# Patient Record
Sex: FEMALE | Race: OTHER | NOT HISPANIC OR LATINO | URBAN - METROPOLITAN AREA
[De-identification: names, ages, dates, MRNs, and addresses within clinical notes are randomized per-mention and may not be internally consistent; named-entity substitution may affect disease eponyms.]

---

## 2020-03-17 ENCOUNTER — INPATIENT (INPATIENT)
Facility: HOSPITAL | Age: 40
LOS: 5 days | Discharge: HOME | End: 2020-03-23
Attending: OBSTETRICS & GYNECOLOGY | Admitting: OBSTETRICS & GYNECOLOGY
Payer: MEDICAID

## 2020-03-17 VITALS — WEIGHT: 185.19 LBS | HEIGHT: 61 IN

## 2020-03-17 DIAGNOSIS — O26.899 OTHER SPECIFIED PREGNANCY RELATED CONDITIONS, UNSPECIFIED TRIMESTER: ICD-10-CM

## 2020-03-17 DIAGNOSIS — Z34.80 ENCOUNTER FOR SUPERVISION OF OTHER NORMAL PREGNANCY, UNSPECIFIED TRIMESTER: ICD-10-CM

## 2020-03-17 DIAGNOSIS — Z3A.00 WEEKS OF GESTATION OF PREGNANCY NOT SPECIFIED: ICD-10-CM

## 2020-03-17 LAB
APTT BLD: 30.7 SEC — SIGNIFICANT CHANGE UP (ref 27.5–36.3)
BASOPHILS # BLD AUTO: 0.05 K/UL — SIGNIFICANT CHANGE UP (ref 0–0.2)
BASOPHILS NFR BLD AUTO: 0.5 % — SIGNIFICANT CHANGE UP (ref 0–2)
BLD GP AB SCN SERPL QL: SIGNIFICANT CHANGE UP
EOSINOPHIL # BLD AUTO: 0.85 K/UL — HIGH (ref 0–0.5)
EOSINOPHIL NFR BLD AUTO: 8 % — HIGH (ref 0–6)
FIBRINOGEN PPP-MCNC: 517 MG/DL — HIGH (ref 350–510)
GLUCOSE BLDC GLUCOMTR-MCNC: 100 MG/DL — HIGH (ref 70–99)
HCT VFR BLD CALC: 41.1 % — SIGNIFICANT CHANGE UP (ref 34.5–45)
HGB BLD-MCNC: 13.5 G/DL — SIGNIFICANT CHANGE UP (ref 11.5–15.5)
IMM GRANULOCYTES NFR BLD AUTO: 0.6 % — SIGNIFICANT CHANGE UP (ref 0–1.5)
INR BLD: 0.91 RATIO — SIGNIFICANT CHANGE UP (ref 0.88–1.16)
LYMPHOCYTES # BLD AUTO: 19.7 % — SIGNIFICANT CHANGE UP (ref 13–44)
LYMPHOCYTES # BLD AUTO: 2.1 K/UL — SIGNIFICANT CHANGE UP (ref 1–3.3)
MCHC RBC-ENTMCNC: 30.3 PG — SIGNIFICANT CHANGE UP (ref 27–34)
MCHC RBC-ENTMCNC: 32.8 GM/DL — SIGNIFICANT CHANGE UP (ref 32–36)
MCV RBC AUTO: 92.4 FL — SIGNIFICANT CHANGE UP (ref 80–100)
MONOCYTES # BLD AUTO: 0.68 K/UL — SIGNIFICANT CHANGE UP (ref 0–0.9)
MONOCYTES NFR BLD AUTO: 6.4 % — SIGNIFICANT CHANGE UP (ref 2–14)
NEUTROPHILS # BLD AUTO: 6.91 K/UL — SIGNIFICANT CHANGE UP (ref 1.8–7.4)
NEUTROPHILS NFR BLD AUTO: 64.8 % — SIGNIFICANT CHANGE UP (ref 43–77)
NRBC # BLD: 0 /100 WBCS — SIGNIFICANT CHANGE UP (ref 0–0)
PLATELET # BLD AUTO: 155 K/UL — SIGNIFICANT CHANGE UP (ref 150–400)
PROTHROM AB SERPL-ACNC: 10.2 SEC — SIGNIFICANT CHANGE UP (ref 10–12.9)
RBC # BLD: 4.45 M/UL — SIGNIFICANT CHANGE UP (ref 3.8–5.2)
RBC # FLD: 15.5 % — HIGH (ref 10.3–14.5)
WBC # BLD: 10.65 K/UL — HIGH (ref 3.8–10.5)
WBC # FLD AUTO: 10.65 K/UL — HIGH (ref 3.8–10.5)

## 2020-03-17 RX ORDER — SODIUM CHLORIDE 9 MG/ML
1000 INJECTION, SOLUTION INTRAVENOUS
Refills: 0 | Status: DISCONTINUED | OUTPATIENT
Start: 2020-03-17 | End: 2020-03-20

## 2020-03-17 RX ORDER — SODIUM CHLORIDE 9 MG/ML
1000 INJECTION INTRAMUSCULAR; INTRAVENOUS; SUBCUTANEOUS
Refills: 0 | Status: DISCONTINUED | OUTPATIENT
Start: 2020-03-17 | End: 2020-03-20

## 2020-03-17 RX ORDER — OXYTOCIN 10 UNIT/ML
333.33 VIAL (ML) INJECTION
Qty: 20 | Refills: 0 | Status: DISCONTINUED | OUTPATIENT
Start: 2020-03-17 | End: 2020-03-17

## 2020-03-17 RX ORDER — CITRIC ACID/SODIUM CITRATE 300-500 MG
15 SOLUTION, ORAL ORAL EVERY 6 HOURS
Refills: 0 | Status: DISCONTINUED | OUTPATIENT
Start: 2020-03-17 | End: 2020-03-20

## 2020-03-17 RX ADMIN — SODIUM CHLORIDE 125 MILLILITER(S): 9 INJECTION, SOLUTION INTRAVENOUS at 18:50

## 2020-03-18 LAB
GLUCOSE BLDC GLUCOMTR-MCNC: 118 MG/DL — HIGH (ref 70–99)
GLUCOSE BLDC GLUCOMTR-MCNC: 80 MG/DL — SIGNIFICANT CHANGE UP (ref 70–99)
GLUCOSE BLDC GLUCOMTR-MCNC: 81 MG/DL — SIGNIFICANT CHANGE UP (ref 70–99)
GLUCOSE BLDC GLUCOMTR-MCNC: 97 MG/DL — SIGNIFICANT CHANGE UP (ref 70–99)
T PALLIDUM AB TITR SER: NEGATIVE — SIGNIFICANT CHANGE UP

## 2020-03-18 RX ORDER — SODIUM CHLORIDE 9 MG/ML
1000 INJECTION, SOLUTION INTRAVENOUS
Refills: 0 | Status: DISCONTINUED | OUTPATIENT
Start: 2020-03-18 | End: 2020-03-23

## 2020-03-18 RX ADMIN — SODIUM CHLORIDE 125 MILLILITER(S): 9 INJECTION, SOLUTION INTRAVENOUS at 14:24

## 2020-03-18 RX ADMIN — SODIUM CHLORIDE 125 MILLILITER(S): 9 INJECTION, SOLUTION INTRAVENOUS at 17:00

## 2020-03-19 LAB
GLUCOSE BLDC GLUCOMTR-MCNC: 84 MG/DL — SIGNIFICANT CHANGE UP (ref 70–99)
GLUCOSE BLDC GLUCOMTR-MCNC: 86 MG/DL — SIGNIFICANT CHANGE UP (ref 70–99)
GLUCOSE BLDC GLUCOMTR-MCNC: 86 MG/DL — SIGNIFICANT CHANGE UP (ref 70–99)
GLUCOSE BLDC GLUCOMTR-MCNC: 91 MG/DL — SIGNIFICANT CHANGE UP (ref 70–99)
GLUCOSE BLDC GLUCOMTR-MCNC: 99 MG/DL — SIGNIFICANT CHANGE UP (ref 70–99)

## 2020-03-19 RX ORDER — OXYTOCIN 10 UNIT/ML
2 VIAL (ML) INJECTION
Qty: 30 | Refills: 0 | Status: DISCONTINUED | OUTPATIENT
Start: 2020-03-19 | End: 2020-03-20

## 2020-03-19 RX ADMIN — SODIUM CHLORIDE 125 MILLILITER(S): 9 INJECTION, SOLUTION INTRAVENOUS at 16:01

## 2020-03-19 RX ADMIN — Medication 2 MILLIUNIT(S)/MIN: at 15:40

## 2020-03-20 LAB
BLD GP AB SCN SERPL QL: SIGNIFICANT CHANGE UP
GLUCOSE BLDC GLUCOMTR-MCNC: 129 MG/DL — HIGH (ref 70–99)

## 2020-03-20 PROCEDURE — 88307 TISSUE EXAM BY PATHOLOGIST: CPT | Mod: 26

## 2020-03-20 RX ORDER — AZITHROMYCIN 500 MG/1
500 TABLET, FILM COATED ORAL ONCE
Refills: 0 | Status: COMPLETED | OUTPATIENT
Start: 2020-03-20 | End: 2020-03-20

## 2020-03-20 RX ORDER — IBUPROFEN 200 MG
600 TABLET ORAL EVERY 6 HOURS
Refills: 0 | Status: DISCONTINUED | OUTPATIENT
Start: 2020-03-21 | End: 2020-03-23

## 2020-03-20 RX ORDER — OXYCODONE HYDROCHLORIDE 5 MG/1
5 TABLET ORAL
Refills: 0 | Status: DISCONTINUED | OUTPATIENT
Start: 2020-03-20 | End: 2020-03-23

## 2020-03-20 RX ORDER — DIPHENHYDRAMINE HCL 50 MG
25 CAPSULE ORAL EVERY 6 HOURS
Refills: 0 | Status: DISCONTINUED | OUTPATIENT
Start: 2020-03-20 | End: 2020-03-23

## 2020-03-20 RX ORDER — CEFAZOLIN SODIUM 1 G
2000 VIAL (EA) INJECTION ONCE
Refills: 0 | Status: COMPLETED | OUTPATIENT
Start: 2020-03-20 | End: 2020-03-20

## 2020-03-20 RX ORDER — FERROUS SULFATE 325(65) MG
325 TABLET ORAL DAILY
Refills: 0 | Status: DISCONTINUED | OUTPATIENT
Start: 2020-03-21 | End: 2020-03-23

## 2020-03-20 RX ORDER — KETOROLAC TROMETHAMINE 30 MG/ML
30 SYRINGE (ML) INJECTION EVERY 6 HOURS
Refills: 0 | Status: DISCONTINUED | OUTPATIENT
Start: 2020-03-20 | End: 2020-03-21

## 2020-03-20 RX ORDER — ASCORBIC ACID 60 MG
500 TABLET,CHEWABLE ORAL DAILY
Refills: 0 | Status: DISCONTINUED | OUTPATIENT
Start: 2020-03-21 | End: 2020-03-23

## 2020-03-20 RX ORDER — LANOLIN
1 OINTMENT (GRAM) TOPICAL EVERY 6 HOURS
Refills: 0 | Status: DISCONTINUED | OUTPATIENT
Start: 2020-03-20 | End: 2020-03-23

## 2020-03-20 RX ORDER — ACETAMINOPHEN 500 MG
975 TABLET ORAL EVERY 6 HOURS
Refills: 0 | Status: DISCONTINUED | OUTPATIENT
Start: 2020-03-20 | End: 2020-03-23

## 2020-03-20 RX ORDER — OXYTOCIN 10 UNIT/ML
2 VIAL (ML) INJECTION
Qty: 30 | Refills: 0 | Status: DISCONTINUED | OUTPATIENT
Start: 2020-03-20 | End: 2020-03-23

## 2020-03-20 RX ORDER — HEPARIN SODIUM 5000 [USP'U]/ML
5000 INJECTION INTRAVENOUS; SUBCUTANEOUS EVERY 12 HOURS
Refills: 0 | Status: DISCONTINUED | OUTPATIENT
Start: 2020-03-20 | End: 2020-03-23

## 2020-03-20 RX ORDER — FOLIC ACID 0.8 MG
1 TABLET ORAL DAILY
Refills: 0 | Status: DISCONTINUED | OUTPATIENT
Start: 2020-03-21 | End: 2020-03-23

## 2020-03-20 RX ORDER — SIMETHICONE 80 MG/1
80 TABLET, CHEWABLE ORAL EVERY 4 HOURS
Refills: 0 | Status: DISCONTINUED | OUTPATIENT
Start: 2020-03-20 | End: 2020-03-23

## 2020-03-20 RX ORDER — OXYTOCIN 10 UNIT/ML
333.33 VIAL (ML) INJECTION
Qty: 20 | Refills: 0 | Status: DISCONTINUED | OUTPATIENT
Start: 2020-03-20 | End: 2020-03-23

## 2020-03-20 RX ORDER — SODIUM CHLORIDE 9 MG/ML
1000 INJECTION, SOLUTION INTRAVENOUS
Refills: 0 | Status: DISCONTINUED | OUTPATIENT
Start: 2020-03-20 | End: 2020-03-23

## 2020-03-20 RX ORDER — TETANUS TOXOID, REDUCED DIPHTHERIA TOXOID AND ACELLULAR PERTUSSIS VACCINE, ADSORBED 5; 2.5; 8; 8; 2.5 [IU]/.5ML; [IU]/.5ML; UG/.5ML; UG/.5ML; UG/.5ML
0.5 SUSPENSION INTRAMUSCULAR ONCE
Refills: 0 | Status: DISCONTINUED | OUTPATIENT
Start: 2020-03-20 | End: 2020-03-23

## 2020-03-20 RX ADMIN — Medication 15 MILLILITER(S): at 04:09

## 2020-03-20 RX ADMIN — Medication 30 MILLIGRAM(S): at 23:26

## 2020-03-20 RX ADMIN — Medication 1000 MILLIUNIT(S)/MIN: at 04:59

## 2020-03-20 RX ADMIN — Medication 975 MILLIGRAM(S): at 20:45

## 2020-03-20 RX ADMIN — Medication 100 MILLIGRAM(S): at 04:00

## 2020-03-20 RX ADMIN — SIMETHICONE 80 MILLIGRAM(S): 80 TABLET, CHEWABLE ORAL at 20:03

## 2020-03-20 RX ADMIN — HEPARIN SODIUM 5000 UNIT(S): 5000 INJECTION INTRAVENOUS; SUBCUTANEOUS at 18:09

## 2020-03-20 RX ADMIN — AZITHROMYCIN 255 MILLIGRAM(S): 500 TABLET, FILM COATED ORAL at 04:51

## 2020-03-20 RX ADMIN — Medication 30 MILLIGRAM(S): at 07:50

## 2020-03-20 RX ADMIN — Medication 975 MILLIGRAM(S): at 20:02

## 2020-03-20 RX ADMIN — SODIUM CHLORIDE 125 MILLILITER(S): 9 INJECTION, SOLUTION INTRAVENOUS at 02:00

## 2020-03-20 RX ADMIN — Medication 30 MILLIGRAM(S): at 08:20

## 2020-03-20 RX ADMIN — SODIUM CHLORIDE 125 MILLILITER(S): 9 INJECTION, SOLUTION INTRAVENOUS at 06:00

## 2020-03-20 RX ADMIN — Medication 30 MILLIGRAM(S): at 23:56

## 2020-03-20 NOTE — CHART NOTE - NSCHARTNOTEFT_GEN_A_CORE
OBGYBABITA ESTEBAN POST NOTE    Patient seen at bedside, resting comfortably offers no new complaints. Due to ambulate, delgado to be removed and due to void, tolerating clear diet at this time.  Attempting breastfeeding.  Denies HA, CP, SOB, N/V/D, dizziness, palpitations, worsening abdominal pain, worsening vaginal bleeding, or any other concerns.    Vital Signs Last 24 Hrs  T(C): 37.5 (20 Mar 2020 08:55), Max: 37.7 (20 Mar 2020 07:00)  T(F): 99.5 (20 Mar 2020 08:55), Max: 99.9 (20 Mar 2020 07:00)  HR: 77 (20 Mar 2020 08:55) (72 - 80)  BP: 132/77 (20 Mar 2020 08:55) (107/80 - 132/77)  BP(mean): 74 (20 Mar 2020 08:00) (74 - 86)  RR: 18 (20 Mar 2020 08:55) (18 - 23)  SpO2: 97% (20 Mar 2020 08:55) (94% - 97%)    Gen: A&O x 3, NAD  Chest: CTABL  Cardiac: S1+S2+ RRR  Breast: Soft, nontender, nonengorged  Abdomen: +BS; soft; NT; ND; Dressing C/D/I  Gyn: Minimal lochia  Ext: Nontender, DTRS 2+, no worsening edema, venodynes intact        A/P: POD #0 s/p c/s       -Pain management as needed  -Advance as per protocol  -OOB and ambulate  -f/u Rpt CBC   -DC delgado f/u void  -Advance diet with flatus  -Encourage breastfeeding   -d/w Dr. Lopez

## 2020-03-21 LAB
BASOPHILS # BLD AUTO: 0.08 K/UL — SIGNIFICANT CHANGE UP (ref 0–0.2)
BASOPHILS NFR BLD AUTO: 0.4 % — SIGNIFICANT CHANGE UP (ref 0–2)
EOSINOPHIL # BLD AUTO: 0.26 K/UL — SIGNIFICANT CHANGE UP (ref 0–0.5)
EOSINOPHIL NFR BLD AUTO: 1.3 % — SIGNIFICANT CHANGE UP (ref 0–6)
HCT VFR BLD CALC: 36.8 % — SIGNIFICANT CHANGE UP (ref 34.5–45)
HGB BLD-MCNC: 12 G/DL — SIGNIFICANT CHANGE UP (ref 11.5–15.5)
IMM GRANULOCYTES NFR BLD AUTO: 0.6 % — SIGNIFICANT CHANGE UP (ref 0–1.5)
LYMPHOCYTES # BLD AUTO: 12.5 % — LOW (ref 13–44)
LYMPHOCYTES # BLD AUTO: 2.53 K/UL — SIGNIFICANT CHANGE UP (ref 1–3.3)
MCHC RBC-ENTMCNC: 30.5 PG — SIGNIFICANT CHANGE UP (ref 27–34)
MCHC RBC-ENTMCNC: 32.6 GM/DL — SIGNIFICANT CHANGE UP (ref 32–36)
MCV RBC AUTO: 93.6 FL — SIGNIFICANT CHANGE UP (ref 80–100)
MONOCYTES # BLD AUTO: 1.02 K/UL — HIGH (ref 0–0.9)
MONOCYTES NFR BLD AUTO: 5 % — SIGNIFICANT CHANGE UP (ref 2–14)
NEUTROPHILS # BLD AUTO: 16.23 K/UL — HIGH (ref 1.8–7.4)
NEUTROPHILS NFR BLD AUTO: 80.2 % — HIGH (ref 43–77)
NRBC # BLD: 0 /100 WBCS — SIGNIFICANT CHANGE UP (ref 0–0)
PLATELET # BLD AUTO: 151 K/UL — SIGNIFICANT CHANGE UP (ref 150–400)
RBC # BLD: 3.93 M/UL — SIGNIFICANT CHANGE UP (ref 3.8–5.2)
RBC # FLD: 15.8 % — HIGH (ref 10.3–14.5)
WBC # BLD: 20.25 K/UL — HIGH (ref 3.8–10.5)
WBC # FLD AUTO: 20.25 K/UL — HIGH (ref 3.8–10.5)

## 2020-03-21 RX ADMIN — Medication 975 MILLIGRAM(S): at 22:40

## 2020-03-21 RX ADMIN — SIMETHICONE 80 MILLIGRAM(S): 80 TABLET, CHEWABLE ORAL at 14:36

## 2020-03-21 RX ADMIN — Medication 600 MILLIGRAM(S): at 11:46

## 2020-03-21 RX ADMIN — Medication 1 MILLIGRAM(S): at 11:46

## 2020-03-21 RX ADMIN — Medication 1 TABLET(S): at 11:46

## 2020-03-21 RX ADMIN — Medication 600 MILLIGRAM(S): at 17:11

## 2020-03-21 RX ADMIN — Medication 500 MILLIGRAM(S): at 11:46

## 2020-03-21 RX ADMIN — Medication 975 MILLIGRAM(S): at 22:07

## 2020-03-21 RX ADMIN — SIMETHICONE 80 MILLIGRAM(S): 80 TABLET, CHEWABLE ORAL at 22:07

## 2020-03-21 RX ADMIN — SIMETHICONE 80 MILLIGRAM(S): 80 TABLET, CHEWABLE ORAL at 05:24

## 2020-03-21 RX ADMIN — SIMETHICONE 80 MILLIGRAM(S): 80 TABLET, CHEWABLE ORAL at 09:27

## 2020-03-21 RX ADMIN — Medication 600 MILLIGRAM(S): at 05:25

## 2020-03-21 RX ADMIN — HEPARIN SODIUM 5000 UNIT(S): 5000 INJECTION INTRAVENOUS; SUBCUTANEOUS at 05:32

## 2020-03-21 RX ADMIN — Medication 325 MILLIGRAM(S): at 11:46

## 2020-03-21 RX ADMIN — HEPARIN SODIUM 5000 UNIT(S): 5000 INJECTION INTRAVENOUS; SUBCUTANEOUS at 17:46

## 2020-03-21 RX ADMIN — Medication 600 MILLIGRAM(S): at 12:40

## 2020-03-21 RX ADMIN — Medication 600 MILLIGRAM(S): at 06:20

## 2020-03-21 RX ADMIN — Medication 600 MILLIGRAM(S): at 18:15

## 2020-03-21 RX ADMIN — SIMETHICONE 80 MILLIGRAM(S): 80 TABLET, CHEWABLE ORAL at 17:46

## 2020-03-21 NOTE — PROGRESS NOTE ADULT - SUBJECTIVE AND OBJECTIVE BOX
Patient seen at bedside resting comfortably, offers no new complaints. Not yet ambulating, delgado just removed no spontaneous voiding yet. + flatus;  no BM; tolerating clear liquid diet. Both breast and bottle feeding. Denies HA, blurry vision or epigastric pain, CP, SOB, N/V/D,  dizziness, palpitations, worsening vaginal bleeding.    Vital Signs Last 24 Hrs  T(C): 36.6 (21 Mar 2020 06:22), Max: 36.8 (20 Mar 2020 18:03)  T(F): 97.9 (21 Mar 2020 06:22), Max: 98.3 (20 Mar 2020 18:03)  HR: 79 (21 Mar 2020 06:22) (72 - 79)  BP: 99/68 (21 Mar 2020 06:22) (95/60 - 101/65)  BP(mean): --  RR: 17 (21 Mar 2020 06:22) (17 - 18)  SpO2: 96% (21 Mar 2020 06:22) (94% - 96%)    Gen: A&O x 3, NAD  Chest: CTA B/L  Cardiac: S1,S2  RRR  Breast: Soft, nontender, nonengorged  Abdomen: +BS; soft; Nontender, nondistended; dressing removed incision C/D/I steri strips in place  Gyn: minimal lochia   Extremities: Nontender, venodynes in place                          12.0   20.25 )-----------( 151      ( 21 Mar 2020 07:00 )             36.8       A/P: POD #1 s/p c/s at 39w3d  -Pain management as needed  -OOB and ambulate  -rpt cbc in am  -f/u void  -Advance diet to regular  -Encourage breastfeeding   -d/w Dr. Gonzalez

## 2020-03-21 NOTE — PROGRESS NOTE ADULT - PROBLEM SELECTOR PLAN 1
-Pain management as needed  -OOB and ambulate  -rpt cbc in am  -f/u void  -Advance diet to regular  -Encourage breastfeeding   -d/w Dr. Gonzalez

## 2020-03-22 LAB
BASOPHILS # BLD AUTO: 0.05 K/UL — SIGNIFICANT CHANGE UP (ref 0–0.2)
BASOPHILS NFR BLD AUTO: 0.3 % — SIGNIFICANT CHANGE UP (ref 0–2)
EOSINOPHIL # BLD AUTO: 0.37 K/UL — SIGNIFICANT CHANGE UP (ref 0–0.5)
EOSINOPHIL NFR BLD AUTO: 2.6 % — SIGNIFICANT CHANGE UP (ref 0–6)
HCT VFR BLD CALC: 31.7 % — LOW (ref 34.5–45)
HGB BLD-MCNC: 10.3 G/DL — LOW (ref 11.5–15.5)
IMM GRANULOCYTES NFR BLD AUTO: 0.6 % — SIGNIFICANT CHANGE UP (ref 0–1.5)
LYMPHOCYTES # BLD AUTO: 1.61 K/UL — SIGNIFICANT CHANGE UP (ref 1–3.3)
LYMPHOCYTES # BLD AUTO: 11.2 % — LOW (ref 13–44)
MCHC RBC-ENTMCNC: 30.4 PG — SIGNIFICANT CHANGE UP (ref 27–34)
MCHC RBC-ENTMCNC: 32.5 GM/DL — SIGNIFICANT CHANGE UP (ref 32–36)
MCV RBC AUTO: 93.5 FL — SIGNIFICANT CHANGE UP (ref 80–100)
MONOCYTES # BLD AUTO: 0.82 K/UL — SIGNIFICANT CHANGE UP (ref 0–0.9)
MONOCYTES NFR BLD AUTO: 5.7 % — SIGNIFICANT CHANGE UP (ref 2–14)
NEUTROPHILS # BLD AUTO: 11.38 K/UL — HIGH (ref 1.8–7.4)
NEUTROPHILS NFR BLD AUTO: 79.6 % — HIGH (ref 43–77)
NRBC # BLD: 0 /100 WBCS — SIGNIFICANT CHANGE UP (ref 0–0)
PLATELET # BLD AUTO: 159 K/UL — SIGNIFICANT CHANGE UP (ref 150–400)
RBC # BLD: 3.39 M/UL — LOW (ref 3.8–5.2)
RBC # FLD: 15.8 % — HIGH (ref 10.3–14.5)
WBC # BLD: 14.32 K/UL — HIGH (ref 3.8–10.5)
WBC # FLD AUTO: 14.32 K/UL — HIGH (ref 3.8–10.5)

## 2020-03-22 RX ADMIN — Medication 600 MILLIGRAM(S): at 13:00

## 2020-03-22 RX ADMIN — HEPARIN SODIUM 5000 UNIT(S): 5000 INJECTION INTRAVENOUS; SUBCUTANEOUS at 17:47

## 2020-03-22 RX ADMIN — Medication 975 MILLIGRAM(S): at 06:35

## 2020-03-22 RX ADMIN — Medication 325 MILLIGRAM(S): at 11:58

## 2020-03-22 RX ADMIN — Medication 600 MILLIGRAM(S): at 11:58

## 2020-03-22 RX ADMIN — Medication 600 MILLIGRAM(S): at 17:50

## 2020-03-22 RX ADMIN — Medication 975 MILLIGRAM(S): at 06:04

## 2020-03-22 RX ADMIN — Medication 600 MILLIGRAM(S): at 18:50

## 2020-03-22 RX ADMIN — Medication 1 MILLIGRAM(S): at 11:58

## 2020-03-22 RX ADMIN — Medication 500 MILLIGRAM(S): at 11:58

## 2020-03-22 RX ADMIN — HEPARIN SODIUM 5000 UNIT(S): 5000 INJECTION INTRAVENOUS; SUBCUTANEOUS at 06:04

## 2020-03-22 RX ADMIN — Medication 1 TABLET(S): at 11:58

## 2020-03-22 NOTE — DISCHARGE NOTE OB - PLAN OF CARE
pain management, delivery of fetus, encourage breast feeding Continue breastfeeding.  Motrin as needed for pain.  Ambulate daily.  No heavy lifting or anything per vagina x 6 weeks - no sex, tampons, douching, tub baths, etc.  Follow up in office in 1-2 weeks for incision check, and then at 6 weeks for postpartum check. pain management, encourage breast feeding

## 2020-03-22 NOTE — DISCHARGE NOTE OB - HOSPITAL COURSE
patient admitted for medical induction of labor for oligohydramnios  cat 2 tracing, primary  performed  routine post op care given  patient discharged home in stable condition

## 2020-03-22 NOTE — PROGRESS NOTE ADULT - SUBJECTIVE AND OBJECTIVE BOX
OBGYN PA NOTE POD2  Patient seen and evaluated at bedside,  resting comfortably w/o any acute  complaints.  Denies fever, HA, CP, SOB, N/V/D, dizziness, palpitations, worsening abdominal pain, worsening vaginal bleeding, or any other concerns.  Ambulating and voiding without difficulty.  Passing flatus and tolerating regular diet.  Attempting to breastfeed.     Vital Signs Last 24 Hrs  T(C): 36.7 (22 Mar 2020 06:22), Max: 37.2 (21 Mar 2020 19:30)  T(F): 98.1 (22 Mar 2020 06:22), Max: 98.9 (21 Mar 2020 19:30)  HR: 82 (22 Mar 2020 06:22) (82 - 100)  BP: 118/80 (22 Mar 2020 06:22) (104/71 - 118/80)  BP(mean): --  RR: 16 (22 Mar 2020 06:22) (16 - 18)  SpO2: 97% (22 Mar 2020 06:22) (97% - 97%)    Physical Exam:     Gen: A&Ox 3, NAD  Chest: CTAB/L  Cardiac: S1+S2+ RRR  Breast: Soft, nontender, nonengorged  Abdomen: Soft, nontender, Fundus firm below umbilicus, +BS. incision clean, dry and intact  Gyn: Mild lochia,   Extremities: Nontender, DTRS 2+, no worsening edema                          10.3   14.32 )-----------( 159      ( 22 Mar 2020 06:41 )             31.7         A/P:  39y POD # 2 s/p primary  @ 39w3d for cat 2 tracing, admitted for miol for oligo, currently  in stable condition  -- Heparin for dvt prophylaxis  --OOB  --incentive spirometry  -- continue pain mgmt  --continue post op care  Dr Lopez aware

## 2020-03-22 NOTE — DISCHARGE NOTE OB - MEDICATION SUMMARY - MEDICATIONS TO TAKE
I will START or STAY ON the medications listed below when I get home from the hospital:    ibuprofen 600 mg oral tablet  -- 1 tab(s) by mouth every 6 hours, As needed, Moderate Pain (4 - 6)  -- Indication: For as needed for pain    Prenatal Multivitamins with Folic Acid 1 mg oral tablet  -- 1 tab(s) by mouth once a day  -- Indication: For breastfeeding    Colace 100 mg oral capsule  -- 1 cap(s) by mouth 2 times a day   -- Medication should be taken with plenty of water.    -- Indication: For Stool softener/constipation

## 2020-03-22 NOTE — DISCHARGE NOTE OB - MATERIALS PROVIDED
Immunization Record/Guide to Postpartum Care/Birth Certificate Instructions/Shaken Baby Prevention Handout/Breastfeeding Guide and Packet/[facility] [place] Screening Program/Breastfeeding Log/Bottle Feeding Log

## 2020-03-22 NOTE — DISCHARGE NOTE OB - CARE PLAN
Principal Discharge DX:	 delivery delivered  Goal:	pain management, delivery of fetus, encourage breast feeding  Assessment and plan of treatment:	Continue breastfeeding.  Motrin as needed for pain.  Ambulate daily.  No heavy lifting or anything per vagina x 6 weeks - no sex, tampons, douching, tub baths, etc.  Follow up in office in 1-2 weeks for incision check, and then at 6 weeks for postpartum check. Principal Discharge DX:	 delivery delivered  Goal:	pain management, encourage breast feeding  Assessment and plan of treatment:	Continue breastfeeding.  Motrin as needed for pain.  Ambulate daily.  No heavy lifting or anything per vagina x 6 weeks - no sex, tampons, douching, tub baths, etc.  Follow up in office in 1-2 weeks for incision check, and then at 6 weeks for postpartum check.

## 2020-03-22 NOTE — DISCHARGE NOTE OB - CARE PROVIDER_API CALL
Damaris Gonzalez)  Obstetrics and Gynecology  [address] [place] [address]  Phone: [phone]  Fax: [phone]  Established Patient  Follow Up Time: 2 weeks

## 2020-03-22 NOTE — DISCHARGE NOTE OB - CHANGE SANITARY PADS FREQUENTLY.  WASHING AND WIPING SHOULD OCCUR FROM FRONT TO BACK
[facility] Emergency Department Call Back     Hello,    This is Zakiya Ley RN calling from [facility] regarding you recent visit. If you have any questions or concerns, please call the Emergency Department back at [phone].  If not, please disregard this message and have a great day.     
Pt given written and verbal discharge instructions. Pt verbalizes understanding of information, no questions, and respiratory therapy here to go over Albuterol Inhaler instructions.     
Statement Selected

## 2020-03-22 NOTE — DISCHARGE NOTE OB - PATIENT PORTAL LINK FT
You can access the FollowMyHealth Patient Portal offered by [facility] by registering at the following website: [email or web address]. By joining [employer]’s FollowMyHealth portal, you will also be able to view your health information using other applications (apps) compatible with our system.

## 2020-03-22 NOTE — PROGRESS NOTE ADULT - ASSESSMENT
A/P:  39y POD # 2 s/p primary  @ 39w3d for cat 2 tracing, admitted for miol for oligo, currently  in stable condition  -- Heparin for dvt prophylaxis  --OOB  --incentive spirometry  -- continue pain mgmt  --continue post op care  Dr Lopez aware

## 2020-03-23 VITALS
OXYGEN SATURATION: 98 % | RESPIRATION RATE: 17 BRPM | DIASTOLIC BLOOD PRESSURE: 60 MMHG | SYSTOLIC BLOOD PRESSURE: 98 MMHG | HEART RATE: 65 BPM | TEMPERATURE: 98 F

## 2020-03-23 PROCEDURE — 86900 BLOOD TYPING SEROLOGIC ABO: CPT

## 2020-03-23 PROCEDURE — 88307 TISSUE EXAM BY PATHOLOGIST: CPT

## 2020-03-23 PROCEDURE — 59050 FETAL MONITOR W/REPORT: CPT

## 2020-03-23 PROCEDURE — 86901 BLOOD TYPING SEROLOGIC RH(D): CPT

## 2020-03-23 PROCEDURE — 85610 PROTHROMBIN TIME: CPT

## 2020-03-23 PROCEDURE — G0463: CPT

## 2020-03-23 PROCEDURE — 85384 FIBRINOGEN ACTIVITY: CPT

## 2020-03-23 PROCEDURE — 36415 COLL VENOUS BLD VENIPUNCTURE: CPT

## 2020-03-23 PROCEDURE — 85027 COMPLETE CBC AUTOMATED: CPT

## 2020-03-23 PROCEDURE — 86923 COMPATIBILITY TEST ELECTRIC: CPT

## 2020-03-23 PROCEDURE — 86850 RBC ANTIBODY SCREEN: CPT

## 2020-03-23 PROCEDURE — 86780 TREPONEMA PALLIDUM: CPT

## 2020-03-23 PROCEDURE — 82962 GLUCOSE BLOOD TEST: CPT

## 2020-03-23 PROCEDURE — 59025 FETAL NON-STRESS TEST: CPT

## 2020-03-23 PROCEDURE — 85730 THROMBOPLASTIN TIME PARTIAL: CPT

## 2020-03-23 RX ORDER — IBUPROFEN 200 MG
1 TABLET ORAL
Qty: 30 | Refills: 0
Start: 2020-03-23

## 2020-03-23 RX ORDER — DOCUSATE SODIUM 100 MG
1 CAPSULE ORAL
Qty: 30 | Refills: 0
Start: 2020-03-23

## 2020-03-23 RX ADMIN — Medication 600 MILLIGRAM(S): at 11:03

## 2020-03-23 RX ADMIN — HEPARIN SODIUM 5000 UNIT(S): 5000 INJECTION INTRAVENOUS; SUBCUTANEOUS at 06:22

## 2020-03-23 RX ADMIN — Medication 600 MILLIGRAM(S): at 01:50

## 2020-03-23 RX ADMIN — Medication 600 MILLIGRAM(S): at 02:25

## 2020-03-23 NOTE — PROGRESS NOTE ADULT - SUBJECTIVE AND OBJECTIVE BOX
Patient seen at bedside resting comfortably, offers no new complaints. + Ambulation, + void without difficulty, + flatus;  + BM;  tolerating regular diet. Both breastfeeding and bottle feeding. Denies HA, blurry vision or epigastric pain, CP, SOB, N/V/D,  dizziness, palpitations, worsening vaginal bleeding.     Vital Signs Last 24 Hrs  T(C): 36.9 (23 Mar 2020 06:08), Max: 37.3 (22 Mar 2020 18:15)  T(F): 98.4 (23 Mar 2020 06:08), Max: 99.1 (22 Mar 2020 18:15)  HR: 65 (23 Mar 2020 06:08) (65 - 70)  BP: 98/60 (23 Mar 2020 06:08) (98/60 - 100/62)  BP(mean): --  RR: 17 (23 Mar 2020 06:08) (16 - 17)  SpO2: 98% (23 Mar 2020 06:08) (97% - 98%)    Gen: A&O x 3, NAD  Chest: CTA B/L  Cardiac: S1,S2  RRR  Breast: Soft, nontender, nonengorged  Abdomen: +BS; soft; Nontender, nondistended, Incision C/D/I steri strips in place   Gyn: Minimal lochia  Extremities: Nontender, DTRS 2+, no worsening edema                          10.3   14.32 )-----------( 159      ( 22 Mar 2020 06:41 )             31.7       A/P: POD #3 s/p c/s at 39w3d for cat II tracing  -d/c home   -instructions verbalized  -follow up in 1-2weeks in office for incision check  -d/w Dr. Gonzalez

## 2020-03-23 NOTE — PROGRESS NOTE ADULT - PROBLEM SELECTOR PLAN 1
-d/c home   -instructions verbalized  -follow up in 1-2weeks in office for incision check  -d/w Dr. Gonzalez